# Patient Record
Sex: FEMALE | Race: OTHER
[De-identification: names, ages, dates, MRNs, and addresses within clinical notes are randomized per-mention and may not be internally consistent; named-entity substitution may affect disease eponyms.]

---

## 2017-01-18 ENCOUNTER — HOSPITAL ENCOUNTER (EMERGENCY)
Dept: HOSPITAL 15 - ER | Age: 27
LOS: 1 days | Discharge: HOME | End: 2017-01-19
Payer: COMMERCIAL

## 2017-01-18 VITALS — HEIGHT: 57 IN | BODY MASS INDEX: 21.41 KG/M2 | WEIGHT: 99.25 LBS

## 2017-01-18 DIAGNOSIS — R42: ICD-10-CM

## 2017-01-18 DIAGNOSIS — O23.42: Primary | ICD-10-CM

## 2017-01-18 DIAGNOSIS — Z3A.17: ICD-10-CM

## 2017-01-18 DIAGNOSIS — F41.9: ICD-10-CM

## 2017-01-18 LAB
ALBUMIN SERPL-MCNC: 3.2 G/DL (ref 3.4–5)
ALP SERPL-CCNC: 61 U/L (ref 45–117)
ANION GAP SERPL CALCULATED.3IONS-SCNC: 8 MMOL/L (ref 5–15)
BILIRUB SERPL-MCNC: 0.2 MG/DL (ref 0.2–1)
BUN SERPL-MCNC: 10 MG/DL (ref 7–18)
BUN/CREAT SERPL: 26.3
CALCIUM SERPL-MCNC: 9.2 MG/DL (ref 8.5–10.1)
CHLORIDE SERPL-SCNC: 105 MMOL/L (ref 98–107)
CO2 SERPL-SCNC: 24 MMOL/L (ref 21–32)
GLUCOSE SERPL-MCNC: 90 MG/DL (ref 74–106)
HCT VFR BLD AUTO: 34.8 % (ref 36–46)
HGB BLD-MCNC: 11.5 G/DL (ref 12.2–16.2)
MCH RBC QN AUTO: 31.1 PG (ref 28–32)
MCV RBC AUTO: 93.9 FL (ref 80–100)
NRBC BLD QL AUTO: 0 %
POTASSIUM SERPL-SCNC: 3.8 MMOL/L (ref 3.5–5.1)
PROT SERPL-MCNC: 7.3 G/DL (ref 6.4–8.2)
SODIUM SERPL-SCNC: 137 MMOL/L (ref 136–145)

## 2017-01-18 PROCEDURE — 85025 COMPLETE CBC W/AUTO DIFF WBC: CPT

## 2017-01-18 PROCEDURE — 93005 ELECTROCARDIOGRAM TRACING: CPT

## 2017-01-18 PROCEDURE — 84702 CHORIONIC GONADOTROPIN TEST: CPT

## 2017-01-18 PROCEDURE — 76805 OB US >/= 14 WKS SNGL FETUS: CPT

## 2017-01-18 PROCEDURE — 36415 COLL VENOUS BLD VENIPUNCTURE: CPT

## 2017-01-18 PROCEDURE — 80053 COMPREHEN METABOLIC PANEL: CPT

## 2017-01-18 PROCEDURE — 85049 AUTOMATED PLATELET COUNT: CPT

## 2017-01-18 PROCEDURE — 81001 URINALYSIS AUTO W/SCOPE: CPT

## 2017-01-19 VITALS — DIASTOLIC BLOOD PRESSURE: 69 MMHG | SYSTOLIC BLOOD PRESSURE: 100 MMHG

## 2017-04-04 ENCOUNTER — HOSPITAL ENCOUNTER (OUTPATIENT)
Dept: HOSPITAL 15 - LDRP | Age: 27
Setting detail: OBSERVATION
Discharge: HOME | DRG: 566 | End: 2017-04-04
Attending: OBSTETRICS & GYNECOLOGY | Admitting: OBSTETRICS & GYNECOLOGY
Payer: MEDICAID

## 2017-04-04 DIAGNOSIS — O23.43: Primary | ICD-10-CM

## 2017-04-04 DIAGNOSIS — Z3A.00: ICD-10-CM

## 2017-04-04 DIAGNOSIS — R10.9: ICD-10-CM

## 2017-04-04 PROCEDURE — 81002 URINALYSIS NONAUTO W/O SCOPE: CPT

## 2017-04-04 PROCEDURE — 81001 URINALYSIS AUTO W/SCOPE: CPT

## 2017-04-04 PROCEDURE — 59025 FETAL NON-STRESS TEST: CPT

## 2017-04-14 ENCOUNTER — HOSPITAL ENCOUNTER (OUTPATIENT)
Dept: HOSPITAL 15 - LAB | Age: 27
Discharge: HOME | End: 2017-04-14
Attending: OBSTETRICS & GYNECOLOGY
Payer: MEDICAID

## 2017-04-14 DIAGNOSIS — O99.810: ICD-10-CM

## 2017-04-14 DIAGNOSIS — Z34.80: Primary | ICD-10-CM

## 2017-04-14 LAB
HCT VFR BLD AUTO: 33.1 % (ref 36–46)
HGB BLD-MCNC: 11.1 G/DL (ref 12.2–16.2)
MCH RBC QN AUTO: 32.8 PG (ref 28–32)
MCV RBC AUTO: 97.4 FL (ref 80–100)
NRBC BLD QL AUTO: 0 %

## 2017-04-14 PROCEDURE — 82951 GLUCOSE TOLERANCE TEST (GTT): CPT

## 2017-04-14 PROCEDURE — 36415 COLL VENOUS BLD VENIPUNCTURE: CPT

## 2017-04-14 PROCEDURE — 85025 COMPLETE CBC W/AUTO DIFF WBC: CPT

## 2017-05-22 ENCOUNTER — HOSPITAL ENCOUNTER (OUTPATIENT)
Dept: HOSPITAL 15 - LDRP | Age: 27
Setting detail: OBSERVATION
Discharge: HOME | DRG: 566 | End: 2017-05-22
Attending: SPECIALIST | Admitting: SPECIALIST
Payer: MEDICAID

## 2017-05-22 DIAGNOSIS — Z3A.34: ICD-10-CM

## 2017-05-22 DIAGNOSIS — O26.893: Primary | ICD-10-CM

## 2017-05-22 DIAGNOSIS — N89.8: ICD-10-CM

## 2017-05-22 DIAGNOSIS — R10.9: ICD-10-CM

## 2017-05-22 PROCEDURE — 59025 FETAL NON-STRESS TEST: CPT

## 2017-05-22 PROCEDURE — 87070 CULTURE OTHR SPECIMN AEROBIC: CPT

## 2017-05-22 PROCEDURE — 81002 URINALYSIS NONAUTO W/O SCOPE: CPT

## 2017-05-31 ENCOUNTER — HOSPITAL ENCOUNTER (OUTPATIENT)
Dept: HOSPITAL 15 - LAB | Age: 27
Discharge: HOME | End: 2017-05-31
Attending: OBSTETRICS & GYNECOLOGY
Payer: MEDICAID

## 2017-05-31 DIAGNOSIS — Z34.80: Primary | ICD-10-CM

## 2017-05-31 DIAGNOSIS — N76.0: ICD-10-CM

## 2017-05-31 DIAGNOSIS — Z11.3: ICD-10-CM

## 2017-05-31 LAB
HCT VFR BLD AUTO: 34.1 % (ref 36–46)
HGB BLD-MCNC: 11.8 G/DL (ref 12.2–16.2)
MCH RBC QN AUTO: 33.6 PG (ref 28–32)
MCV RBC AUTO: 97 FL (ref 80–100)
NRBC BLD QL AUTO: 0 %

## 2017-05-31 PROCEDURE — 36415 COLL VENOUS BLD VENIPUNCTURE: CPT

## 2017-05-31 PROCEDURE — 87081 CULTURE SCREEN ONLY: CPT

## 2017-05-31 PROCEDURE — 85025 COMPLETE CBC W/AUTO DIFF WBC: CPT

## 2017-06-18 ENCOUNTER — HOSPITAL ENCOUNTER (OUTPATIENT)
Dept: HOSPITAL 15 - LDRP | Age: 27
Setting detail: OBSERVATION
Discharge: HOME | DRG: 955 | End: 2017-06-18
Attending: OBSTETRICS & GYNECOLOGY | Admitting: OBSTETRICS & GYNECOLOGY
Payer: MEDICAID

## 2017-06-18 DIAGNOSIS — O26.899: Primary | ICD-10-CM

## 2017-06-18 DIAGNOSIS — N89.8: ICD-10-CM

## 2017-06-18 DIAGNOSIS — Z3A.00: ICD-10-CM

## 2017-06-18 PROCEDURE — 59025 FETAL NON-STRESS TEST: CPT

## 2017-06-18 PROCEDURE — 81002 URINALYSIS NONAUTO W/O SCOPE: CPT

## 2017-06-25 ENCOUNTER — HOSPITAL ENCOUNTER (OUTPATIENT)
Dept: HOSPITAL 15 - LDRP | Age: 27
Setting detail: OBSERVATION
Discharge: HOME | DRG: 566 | End: 2017-06-25
Attending: SPECIALIST | Admitting: SPECIALIST
Payer: MEDICAID

## 2017-06-25 DIAGNOSIS — Z3A.39: ICD-10-CM

## 2017-06-25 PROCEDURE — 81002 URINALYSIS NONAUTO W/O SCOPE: CPT

## 2017-06-25 PROCEDURE — 59025 FETAL NON-STRESS TEST: CPT

## 2020-03-29 ENCOUNTER — HOSPITAL ENCOUNTER (INPATIENT)
Dept: HOSPITAL 15 - LDRP | Age: 30
LOS: 2 days | Discharge: HOME | DRG: 541 | End: 2020-03-31
Attending: SPECIALIST | Admitting: SPECIALIST
Payer: MEDICAID

## 2020-03-29 VITALS — HEIGHT: 59 IN | WEIGHT: 145 LBS | BODY MASS INDEX: 29.23 KG/M2

## 2020-03-29 DIAGNOSIS — Z3A.39: ICD-10-CM

## 2020-03-29 PROCEDURE — 86901 BLOOD TYPING SEROLOGIC RH(D): CPT

## 2020-03-29 PROCEDURE — 96365 THER/PROPH/DIAG IV INF INIT: CPT

## 2020-03-29 PROCEDURE — 80307 DRUG TEST PRSMV CHEM ANLYZR: CPT

## 2020-03-29 PROCEDURE — 86900 BLOOD TYPING SEROLOGIC ABO: CPT

## 2020-03-29 PROCEDURE — 81001 URINALYSIS AUTO W/SCOPE: CPT

## 2020-03-29 PROCEDURE — 86850 RBC ANTIBODY SCREEN: CPT

## 2020-03-29 PROCEDURE — 36415 COLL VENOUS BLD VENIPUNCTURE: CPT

## 2020-03-29 PROCEDURE — 86592 SYPHILIS TEST NON-TREP QUAL: CPT

## 2020-03-29 PROCEDURE — 96361 HYDRATE IV INFUSION ADD-ON: CPT

## 2020-03-29 PROCEDURE — 85730 THROMBOPLASTIN TIME PARTIAL: CPT

## 2020-03-29 PROCEDURE — 85025 COMPLETE CBC W/AUTO DIFF WBC: CPT

## 2020-03-29 PROCEDURE — 59025 FETAL NON-STRESS TEST: CPT

## 2020-03-29 PROCEDURE — 81002 URINALYSIS NONAUTO W/O SCOPE: CPT

## 2020-03-29 PROCEDURE — 84112 EVAL AMNIOTIC FLUID PROTEIN: CPT

## 2020-03-29 PROCEDURE — 85610 PROTHROMBIN TIME: CPT

## 2020-03-29 PROCEDURE — 59409 OBSTETRICAL CARE: CPT

## 2020-03-29 PROCEDURE — 80053 COMPREHEN METABOLIC PANEL: CPT

## 2020-03-30 VITALS — SYSTOLIC BLOOD PRESSURE: 102 MMHG | DIASTOLIC BLOOD PRESSURE: 64 MMHG

## 2020-03-30 VITALS — DIASTOLIC BLOOD PRESSURE: 61 MMHG | SYSTOLIC BLOOD PRESSURE: 117 MMHG

## 2020-03-30 VITALS — DIASTOLIC BLOOD PRESSURE: 71 MMHG | SYSTOLIC BLOOD PRESSURE: 112 MMHG

## 2020-03-30 VITALS — SYSTOLIC BLOOD PRESSURE: 110 MMHG | DIASTOLIC BLOOD PRESSURE: 66 MMHG

## 2020-03-30 VITALS — SYSTOLIC BLOOD PRESSURE: 128 MMHG | DIASTOLIC BLOOD PRESSURE: 62 MMHG

## 2020-03-30 VITALS — SYSTOLIC BLOOD PRESSURE: 120 MMHG | DIASTOLIC BLOOD PRESSURE: 65 MMHG

## 2020-03-30 VITALS — SYSTOLIC BLOOD PRESSURE: 119 MMHG | DIASTOLIC BLOOD PRESSURE: 64 MMHG

## 2020-03-30 LAB
ALBUMIN SERPL-MCNC: 2.4 G/DL (ref 3.4–5)
ALCOHOL, URINE: < 3 MG/DL (ref 0–5)
ALP SERPL-CCNC: 210 U/L (ref 45–117)
ALT SERPL-CCNC: 12 U/L (ref 13–56)
AMPHETAMINES UR QL SCN: NEGATIVE
ANION GAP SERPL CALCULATED.3IONS-SCNC: 11 MMOL/L (ref 5–15)
APTT PPP: 25 SEC (ref 23.64–32.05)
BARBITURATES UR QL SCN: NEGATIVE
BENZODIAZ UR QL SCN: NEGATIVE
BILIRUB SERPL-MCNC: 0.2 MG/DL (ref 0.2–1)
BUN SERPL-MCNC: 8 MG/DL (ref 7–18)
BUN/CREAT SERPL: 14.3
BZE UR QL SCN: NEGATIVE
CALCIUM SERPL-MCNC: 9.4 MG/DL (ref 8.5–10.1)
CANNABINOIDS UR QL SCN: NEGATIVE
CHLORIDE SERPL-SCNC: 106 MMOL/L (ref 98–107)
CO2 SERPL-SCNC: 18 MMOL/L (ref 21–32)
GLUCOSE SERPL-MCNC: 85 MG/DL (ref 74–106)
HCT VFR BLD AUTO: 39.9 % (ref 36–46)
HGB BLD-MCNC: 13.4 G/DL (ref 12.2–16.2)
INR PPP: 0.92 (ref 0.9–1.15)
MCH RBC QN AUTO: 32.3 PG (ref 28–32)
MCV RBC AUTO: 96.4 FL (ref 80–100)
NRBC BLD QL AUTO: 0.1 %
OPIATES UR QL SCN: NEGATIVE
PCP UR QL SCN: NEGATIVE
POTASSIUM SERPL-SCNC: 3.9 MMOL/L (ref 3.5–5.1)
PROT SERPL-MCNC: 7.1 G/DL (ref 6.4–8.2)
SODIUM SERPL-SCNC: 135 MMOL/L (ref 136–145)

## 2020-03-30 PROCEDURE — 3E0R3BZ INTRODUCTION OF ANESTHETIC AGENT INTO SPINAL CANAL, PERCUTANEOUS APPROACH: ICD-10-PCS

## 2020-03-30 PROCEDURE — 10D17Z9 MANUAL EXTRACTION OF PRODUCTS OF CONCEPTION, RETAINED, VIA NATURAL OR ARTIFICIAL OPENING: ICD-10-PCS | Performed by: OBSTETRICS & GYNECOLOGY

## 2020-03-30 PROCEDURE — 00HU33Z INSERTION OF INFUSION DEVICE INTO SPINAL CANAL, PERCUTANEOUS APPROACH: ICD-10-PCS

## 2020-03-30 RX ADMIN — Medication PRN APPLIC: at 17:32

## 2020-03-30 RX ADMIN — WITCH HAZEL PRN PAD: 500 SOLUTION RECTAL; TOPICAL at 17:32

## 2020-03-30 RX ADMIN — Medication PRN APPLIC: at 04:25

## 2020-03-30 RX ADMIN — WITCH HAZEL PRN PAD: 500 SOLUTION RECTAL; TOPICAL at 04:25

## 2020-03-30 RX ADMIN — CEFAZOLIN SODIUM SCH MLS/HR: 1 INJECTION, SOLUTION INTRAVENOUS at 18:50

## 2020-03-30 RX ADMIN — IBUPROFEN PRN MG: 600 TABLET, FILM COATED ORAL at 10:36

## 2020-03-30 RX ADMIN — PENICILLIN G POTASSIUM SCH MLS/HR: 5000000 POWDER, FOR SOLUTION INTRAMUSCULAR; INTRAPLEURAL; INTRATHECAL; INTRAVENOUS at 04:27

## 2020-03-30 RX ADMIN — IBUPROFEN PRN MG: 600 TABLET, FILM COATED ORAL at 22:07

## 2020-03-30 RX ADMIN — PENICILLIN G POTASSIUM SCH MLS/HR: 5000000 POWDER, FOR SOLUTION INTRAMUSCULAR; INTRAPLEURAL; INTRATHECAL; INTRAVENOUS at 00:10

## 2020-03-30 RX ADMIN — IBUPROFEN PRN MG: 600 TABLET, FILM COATED ORAL at 17:20

## 2020-03-30 NOTE — NUR
Clements Catheter discontinued. Pt tolerated well. 700 cc's clear yellow urine in reserve. 
Pericare provided. Ice/Tucks/Spray

## 2020-03-30 NOTE — NUR
Out of bed to sit in chair while complete linen change completed. Pt tolerated well. 
Pericare provided

## 2020-03-30 NOTE — NUR
Patient out of bed to void 400 clear yellow urine. Pt verbalized understanding that she will 
void again in the container in the toilet

## 2020-03-31 VITALS — DIASTOLIC BLOOD PRESSURE: 67 MMHG | SYSTOLIC BLOOD PRESSURE: 102 MMHG

## 2020-03-31 VITALS — SYSTOLIC BLOOD PRESSURE: 92 MMHG | DIASTOLIC BLOOD PRESSURE: 57 MMHG

## 2020-03-31 RX ADMIN — CEFAZOLIN SODIUM SCH MLS/HR: 1 INJECTION, SOLUTION INTRAVENOUS at 02:43

## 2020-03-31 RX ADMIN — IBUPROFEN PRN MG: 600 TABLET, FILM COATED ORAL at 06:45

## 2020-03-31 RX ADMIN — IBUPROFEN PRN MG: 600 TABLET, FILM COATED ORAL at 02:42

## 2020-03-31 NOTE — NUR
Discharge:

Patient taken to vehicle via ambulating with all personal belongings, accompanied by staff 
and family member.  No distress noted at time of departure, no adverse changes in status 
since initial assessment.